# Patient Record
Sex: FEMALE | Race: WHITE | NOT HISPANIC OR LATINO | URBAN - METROPOLITAN AREA
[De-identification: names, ages, dates, MRNs, and addresses within clinical notes are randomized per-mention and may not be internally consistent; named-entity substitution may affect disease eponyms.]

---

## 2018-02-18 ENCOUNTER — EMERGENCY (EMERGENCY)
Facility: HOSPITAL | Age: 33
LOS: 1 days | Discharge: HOME | End: 2018-02-18
Attending: EMERGENCY MEDICINE | Admitting: EMERGENCY MEDICINE
Payer: MEDICARE

## 2018-02-18 VITALS
RESPIRATION RATE: 16 BRPM | TEMPERATURE: 98 F | WEIGHT: 179.9 LBS | DIASTOLIC BLOOD PRESSURE: 70 MMHG | HEIGHT: 61 IN | OXYGEN SATURATION: 93 % | SYSTOLIC BLOOD PRESSURE: 112 MMHG | HEART RATE: 81 BPM

## 2018-02-18 PROCEDURE — 99284 EMERGENCY DEPT VISIT MOD MDM: CPT

## 2018-02-18 PROCEDURE — 99284 EMERGENCY DEPT VISIT MOD MDM: CPT | Mod: 25

## 2018-02-18 RX ADMIN — Medication 100 MILLIGRAM(S): at 15:32

## 2018-02-18 NOTE — CHART NOTE - NSCHARTNOTEFT_GEN_A_CORE
Landy 13:55 pt medically stable to return to Detox progrma at [facility]. Worker called [facility] spoke with PA Karlos) pt ok to return. [facility] transport arranged (Mary) ETA 20-30 minutes. No other concerns reported at this moment.

## 2018-02-18 NOTE — ED ADULT NURSE NOTE - OBJECTIVE STATEMENT
pt went to [facility] for rehab for heroin, last used last night, sent for abscess of left arm

## 2018-02-18 NOTE — ED ADULT NURSE NOTE - CHIEF COMPLAINT QUOTE
pt went to [facility] for rehab for heroin, last used last night, sent for abscess of left arm

## 2018-02-18 NOTE — ED ADULT TRIAGE NOTE - CHIEF COMPLAINT QUOTE
pt went to [facility] for rehab for heroin, last used last night, sent for abscess of left arm

## 2018-02-18 NOTE — ED PROVIDER NOTE - MEDICAL DECISION MAKING DETAILS
Patient with recent heroin use presents complaining of abscess to the left upper arm. Bedside US shows dense scar tissue with small pocket of purulence. Based on this finding, will treat with oral doxycycline to cover for MRSA as patient is Sulfa allergic and plan to D/C. No blood work indicated at this time as she has no systemic symptoms.

## 2018-02-18 NOTE — ED PROVIDER NOTE - NS ED ROS FT
Const: Denies fever, chills  HEENT: Denies blurry vision, sore throat  Neck: Denies neck pain/stiffness  Resp: Denies coughing, SOB  Cardiovascular: Denies CP, palpitations, LE edema  GI: Denies nausea, vomiting, abdominal pain, diarrhea, constipation, blood in stool  : Denies urinary frequency/urgency/dysuria, hematuria  MSK: Denies back pain  Neuro: Denies HA, dizziness, numbness, weakness  Skin: + abscess to left arm; + multiple small abscesses to right arm.

## 2018-02-18 NOTE — ED PROVIDER NOTE - OBJECTIVE STATEMENT
Patient with PMH heroin use (last used last night) presents from [facility] for evaluation of left upper arm abscess. She states the area is mildly painful with palpation, but otherwise denies fevers, nausea, vomiting, HA or dizziness. She notes various smaller abscesses on the right arm as well consistent with recent areas of IV heroin use. She notes allergy to sulfa (unsure of reaction), admits to smoking cigarettes and chronic Xanax use. She denies EtOH.

## 2018-02-18 NOTE — ED PROVIDER NOTE - PHYSICAL EXAMINATION
Const: Awake, alert and oriented. In no acute distress. Well appearing.  HEENT: NC/AT. Moist mucous membranes.  Eyes: No scleral icterus. EOMI.  Neck:. Soft and supple. Full ROM without pain.  Cardiac: Regular rate and rhythm. +S1/S2. No murmurs. Peripheral pulses 2+ and symmetric. No LE edema.  Resp: Speaking in full sentences. No evidence of respiratory distress. No wheezes, rales or rhonchi.  Abd: Soft, non-tender, non-distended. Normal bowel sounds in all 4 quadrants. No guarding or rebound.  Back: Spine midline and non-tender. No CVAT.  Skin: 4 cm x 4 cm area of warmth, erythema and deep fluctuance without overlying induration of the medial aspect of the left proximal arm which is tender to palpation. 1 cm x 1 cm area of fluctuance with TTP on the volar surface of the right forearm. No erythematous streaking.  Lymph: No cervical lymphadenopathy.

## 2020-02-24 NOTE — ED ADULT NURSE NOTE - CAS TRG GENERAL NORM CIRC DET
Pt arrives from home with EMS who state pt daughter called for 2 days of malaise and not wanting to eat. Pt state she is "tired" and appears lethargic. Color WNL and pt denies pain.
Strong peripheral pulses

## 2022-01-02 ENCOUNTER — EMERGENCY (EMERGENCY)
Facility: HOSPITAL | Age: 37
LOS: 1 days | End: 2022-01-02
Admitting: EMERGENCY MEDICINE
Payer: MEDICARE

## 2022-01-02 PROCEDURE — 72131 CT LUMBAR SPINE W/O DYE: CPT | Mod: 26

## 2022-01-02 PROCEDURE — 73564 X-RAY EXAM KNEE 4 OR MORE: CPT | Mod: 26,LT

## 2022-01-02 PROCEDURE — 73700 CT LOWER EXTREMITY W/O DYE: CPT | Mod: 26,LT

## 2022-01-02 PROCEDURE — 99285 EMERGENCY DEPT VISIT HI MDM: CPT

## 2022-01-02 PROCEDURE — 71045 X-RAY EXAM CHEST 1 VIEW: CPT | Mod: 26

## 2022-08-26 ENCOUNTER — APPOINTMENT (OUTPATIENT)
Dept: ORTHOPEDIC SURGERY | Facility: CLINIC | Age: 37
End: 2022-08-26

## 2022-09-06 ENCOUNTER — APPOINTMENT (OUTPATIENT)
Dept: ORTHOPEDIC SURGERY | Facility: CLINIC | Age: 37
End: 2022-09-06

## 2023-02-06 ENCOUNTER — APPOINTMENT (OUTPATIENT)
Dept: ORTHOPEDIC SURGERY | Facility: CLINIC | Age: 38
End: 2023-02-06

## 2023-04-21 ENCOUNTER — APPOINTMENT (OUTPATIENT)
Dept: ORTHOPEDIC SURGERY | Facility: CLINIC | Age: 38
End: 2023-04-21

## 2023-05-19 ENCOUNTER — APPOINTMENT (OUTPATIENT)
Dept: ORTHOPEDIC SURGERY | Facility: CLINIC | Age: 38
End: 2023-05-19